# Patient Record
Sex: FEMALE | Race: WHITE | URBAN - METROPOLITAN AREA
[De-identification: names, ages, dates, MRNs, and addresses within clinical notes are randomized per-mention and may not be internally consistent; named-entity substitution may affect disease eponyms.]

---

## 2019-05-11 ENCOUNTER — EMERGENCY (EMERGENCY)
Facility: HOSPITAL | Age: 34
LOS: 1 days | Discharge: HOME | End: 2019-05-11
Attending: EMERGENCY MEDICINE | Admitting: EMERGENCY MEDICINE
Payer: COMMERCIAL

## 2019-05-11 VITALS
TEMPERATURE: 98 F | SYSTOLIC BLOOD PRESSURE: 110 MMHG | DIASTOLIC BLOOD PRESSURE: 74 MMHG | HEART RATE: 75 BPM | OXYGEN SATURATION: 100 % | RESPIRATION RATE: 18 BRPM
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
## 2019-05-11 DIAGNOSIS — S61.211A LACERATION WITHOUT FOREIGN BODY OF LEFT INDEX FINGER WITHOUT DAMAGE TO NAIL, INITIAL ENCOUNTER: ICD-10-CM
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
## 2019-05-11 DIAGNOSIS — M79.645 PAIN IN LEFT FINGER(S): ICD-10-CM
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
## 2019-05-11 PROCEDURE — 73140 X-RAY EXAM OF FINGER(S): CPT | Mod: 26,LT

## 2019-05-11 PROCEDURE — 99283 EMERGENCY DEPT VISIT LOW MDM: CPT | Mod: 25

## 2019-05-11 PROCEDURE — 12001 RPR S/N/AX/GEN/TRNK 2.5CM/<: CPT

## 2019-05-11 RX ADMIN — Medication 1 TABLET(S): at 19:35

## 2019-05-11 NOTE — ED PROVIDER NOTE - NSFOLLOWUPINSTRUCTIONS_ED_ALL_ED_FT
Keep steri-strip protected and in place for the next week.  "Baby the finger".   Can get it wet after 24h.  Return for signs of infection.

## 2019-05-11 NOTE — ED ADULT NURSE NOTE - NSIMPLEMENTINTERV_GEN_ALL_ED
Implemented All Universal Safety Interventions:  [place] to call system. Call bell, personal items and telephone within reach. Instruct patient to call for assistance. Room bathroom lighting operational. Non-slip footwear when patient is off stretcher. Physically safe environment: no spills, clutter or unnecessary equipment. Stretcher in lowest position, wheels locked, appropriate side rails in place.

## 2019-05-11 NOTE — ED PROVIDER NOTE - SKIN, MLM
1.2cm linear superficial laceration along dorsal aspect of L index finger. Bleeding on site. Copious oozing. 1.5cm linear superficial curvilinear flap like laceration along dorsal aspect of L index finger. Bleeding on site. Copious oozing.

## 2019-05-11 NOTE — ED PROVIDER NOTE - DIAGNOSTIC INTERPRETATION
Interpreted by ED Physician:  Finger L 2nd xray (3 view)- no fx, no jt effusion, no soft tissue swelling

## 2019-05-11 NOTE — ED PROVIDER NOTE - CLINICAL SUMMARY MEDICAL DECISION MAKING FREE TEXT BOX
32 y/o F with no significant PMHx presents to ED with laceration of L index finger. Will order XR of finger for possible injury to phalanges.   Wound was irrigated copiously, closed with Dermabond and 1 steryl strip.

## 2019-05-11 NOTE — ED PROVIDER NOTE - OBJECTIVE STATEMENT
32 y/o F with no significant PMHx presents to ED with laceration of L index finger. Pt reports she was using the  when her hand got caught on the blade of the . She rushed to the ED out of concern for possible suturing needed. Pt states that she does not recall her last tetanus shot.

## 2020-01-15 NOTE — ED ADULT NURSE NOTE - NSFALLRSKHARMRISK_ED_ALL_ED
no

## 2021-08-18 NOTE — ED PROCEDURE NOTE - PROCEDURE DATE TIME, MLM
Med rec updated and complete. Allergies reviewed . Pt denies antibiotic use in last 30 days.  [facility] [phone]         Current Outpatient Medications on File Prior to Encounter   Medication Sig Dispense Refill   • tamsulosin (FLOMAX) 0.4 MG capsule Take 0.4 mg by mouth every day.     • lisinopril (PRINIVIL) 20 MG Tab Take 20 mg by mouth every day.     • finasteride (PROSCAR) 5 MG Tab Take 5 mg by mouth every day.     • levothyroxine (SYNTHROID) 175 MCG Tab Take 175 mcg by mouth Every morning on an empty stomach.     • amLODIPine (NORVASC) 5 MG Tab Take 5 mg by mouth every day.          11-May-2019 19:57

## 2023-06-29 NOTE — ED ADULT NURSE NOTE - NS PRO AD BILL OF RIGHTS
No The Caprini score indicates that this patient is at high risk for a VTE event (score 6 or greater). Surgical patients in this group will benefit from both pharmacologic prophylaxis and intermittent compression devices.  The surgical team will determine the balance between VTE risk and bleeding risk, and other clinical considerations